# Patient Record
Sex: MALE | Race: WHITE | Employment: STUDENT | URBAN - NONMETROPOLITAN AREA
[De-identification: names, ages, dates, MRNs, and addresses within clinical notes are randomized per-mention and may not be internally consistent; named-entity substitution may affect disease eponyms.]

---
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
## 2022-03-27 DIAGNOSIS — F41.9 ANXIETY AND DEPRESSION: ICD-10-CM

## 2022-03-27 DIAGNOSIS — F32.A ANXIETY AND DEPRESSION: ICD-10-CM

## 2022-03-28 RX ORDER — FLUOXETINE HYDROCHLORIDE 40 MG/1
40 CAPSULE ORAL DAILY
Qty: 30 CAPSULE | Refills: 0 | Status: SHIPPED | OUTPATIENT
Start: 2022-03-28 | End: 2022-04-07 | Stop reason: SDUPTHER

## 2022-03-28 NOTE — TELEPHONE ENCOUNTER
Left message on answering machine requesting pt to call back at earliest convenience in regards to scheduling an appt.

## 2022-04-07 ENCOUNTER — TELEMEDICINE (OUTPATIENT)
Dept: FAMILY MEDICINE CLINIC | Age: 22
End: 2022-04-07
Payer: COMMERCIAL

## 2022-04-07 DIAGNOSIS — F41.9 ANXIETY AND DEPRESSION: Primary | ICD-10-CM

## 2022-04-07 DIAGNOSIS — F32.A ANXIETY AND DEPRESSION: Primary | ICD-10-CM

## 2022-04-07 DIAGNOSIS — J45.40 MODERATE PERSISTENT ASTHMA WITHOUT COMPLICATION: ICD-10-CM

## 2022-04-07 PROBLEM — J45.909 ASTHMA: Status: ACTIVE | Noted: 2020-10-05

## 2022-04-07 PROBLEM — F41.8 MIXED ANXIETY DEPRESSIVE DISORDER: Status: ACTIVE | Noted: 2020-10-05

## 2022-04-07 PROCEDURE — 99213 OFFICE O/P EST LOW 20 MIN: CPT | Performed by: STUDENT IN AN ORGANIZED HEALTH CARE EDUCATION/TRAINING PROGRAM

## 2022-04-07 RX ORDER — FLUOXETINE HYDROCHLORIDE 40 MG/1
40 CAPSULE ORAL DAILY
Qty: 90 CAPSULE | Refills: 0 | Status: SHIPPED | OUTPATIENT
Start: 2022-04-07 | End: 2022-05-21 | Stop reason: SDUPTHER

## 2022-04-07 ASSESSMENT — ENCOUNTER SYMPTOMS
COUGH: 0
COLOR CHANGE: 0
SHORTNESS OF BREATH: 0
VOMITING: 0
NAUSEA: 0
WHEEZING: 0

## 2022-04-07 NOTE — PROGRESS NOTES
2022    TELEHEALTH EVALUATION -- Audio/Visual (During MBNOW-29 public health emergency)    HPI:    Gold Clarke (:  2000) has requested an audio/video evaluation for the following concern(s):    Patient presents today for follow-up visit for anxiety and depression. Doing well on the Prozac 40mg daily. States he has been out of it for a week, forgot to  his prescription. Has not been able to get set up with counseling. Has had a busy semester at school. Eventually wants to come off the medication. Asthma: Doing well on the Pulmicort. No exacerbations recently. Does have a minor cold. Review of Systems   Constitutional: Negative for chills and fever. Respiratory: Negative for cough, shortness of breath and wheezing. Cardiovascular: Negative for chest pain. Gastrointestinal: Negative for nausea and vomiting. Skin: Negative for color change and rash. Neurological: Negative for light-headedness and headaches. Psychiatric/Behavioral: Negative for decreased concentration, dysphoric mood, sleep disturbance and suicidal ideas. The patient is not nervous/anxious. Prior to Visit Medications    Medication Sig Taking?  Authorizing Provider   FLUoxetine (PROZAC) 40 MG capsule Take 1 capsule by mouth daily Yes Tessy Ni MD   budesonide (PULMICORT FLEXHALER) 180 MCG/ACT AEPB inhaler Inhale 1 puff into the lungs nightly  Tessy Ni MD       Social History     Tobacco Use    Smoking status: Never Smoker    Smokeless tobacco: Never Used   Substance Use Topics    Alcohol use: Never    Drug use: Never            PHYSICAL EXAMINATION:  Constitutional: [x] Appears well-developed and well-nourished [] No apparent distress      [] Abnormal-   Mental status  [x] Alert and awake  [] Oriented to person/place/time []Able to follow commands      Eyes:  EOM    [x]  Normal  [] Abnormal-  Sclera  [x]  Normal  [] Abnormal -         Discharge [x]  None visible  [] Abnormal -    HENT:   [x] Normocephalic, atraumatic. [] Abnormal   [] Mouth/Throat: Mucous membranes are moist.     External Ears [x] Normal  [] Abnormal-     Neck: [x] No visualized mass     Pulmonary/Chest: [x] Respiratory effort normal.  [] No visualized signs of difficulty breathing or respiratory distress        [] Abnormal-      Musculoskeletal:   [] Normal gait with no signs of ataxia         [x] Normal range of motion of neck        [] Abnormal-       Neurological:        [x] No Facial Asymmetry (Cranial nerve 7 motor function) (limited exam to video visit)          [] No gaze palsy        [] Abnormal-         Skin:        [x] No significant exanthematous lesions or discoloration noted on facial skin         [] Abnormal-            Psychiatric:       [x] Normal Affect [] No Hallucinations        [] Abnormal-       ASSESSMENT/PLAN:  1. Anxiety and depression  - Stable. Continue on Prozac 40mg daily. Refills sent to pharmacy.   - FLUoxetine (PROZAC) 40 MG capsule; Take 1 capsule by mouth daily  Dispense: 90 capsule; Refill: 0    2. Moderate persistent asthma without complication  - Stable. Continue with Pulmicort. Return in about 1 year (around 4/7/2023). Regina Antonio, was evaluated through a synchronous (real-time) audio-video encounter. The patient (or guardian if applicable) is aware that this is a billable service, which includes applicable co-pays. This Virtual Visit was conducted with patient's (and/or legal guardian's) consent. The visit was conducted pursuant to the emergency declaration under the [address] [place] [address], [address] [place] [address] and the [employer] and [employer] General Act. Patient identification was verified, and a caregiver was present when appropriate. The patient was located at home in a state where the provider was licensed to provide care.       Total time spent on this encounter: Not billed by time    --Shavon Tsai MD on 4/7/2022 at 5:40 PM    An electronic signature was used to authenticate this note.

## 2022-04-07 NOTE — PROGRESS NOTES
Anthony Brooks is a 25 y.o. male being evaluated by a Virtual Visit (video visit) encounter to address concerns as mentioned above. A caregiver was present when appropriate. Due to this being a TeleHealth encounter (During IGEKL-89 public health emergency), evaluation of the following organ systems was limited: Vitals/Constitutional/EENT/Resp/CV/GI//MS/Neuro/Skin/Heme-Lymph-Imm. Pursuant to the emergency declaration under the [address] [place] [address] and the [employer] Act, this Virtual Visit was conducted with patient's (and/or legal guardian's) consent, to reduce the patient's risk of exposure to COVID-19 and provide necessary medical care. The patient (and/or legal guardian) has also been advised to contact this office for worsening conditions or problems, and seek emergency medical treatment and/or call 911 if deemed necessary. Services were provided through a video synchronous discussion virtually to substitute for in-person clinic visit. Patient and provider were located at their individual homes. --Fanta Bañuelos MD on 4/7/2022 at 6:01 PM    An electronic signature was used to authenticate this note. SUBJECTIVE     Anthony Brooks is a 25 y.o.male    Chief Complaint   Patient presents with    Follow-up       Chief complaint, [place], and all pertinent details of the case reviewed with the resident. Please see resident's note for specific details discussed at today's visit. Patient Active Problem List   Diagnosis    Asthma    Anxiety and depression       Current Outpatient Medications   Medication Sig Dispense Refill    FLUoxetine (PROZAC) 40 MG capsule Take 1 capsule by mouth daily 90 capsule 0    budesonide (PULMICORT FLEXHALER) 180 MCG/ACT AEPB inhaler Inhale 1 puff into the lungs nightly 1 each 2     No current facility-administered medications for this visit.        Review of Systems per Dr. Eboni Collins 4/7/2022   Patient-Reported Weight 195   Patient-Reported Height 59   Patient-Reported Systolic 50   Patient-Reported Diastolic 50   Patient-Reported Pulse [id number]   Patient-Reported Temperature 98.6        Physical Exam per Dr. Tian Russell      No results found for: LABA1C    No results found for: CHOL, TRIG, HDL, LDLCALC, LDLDIRECT    The ASCVD Risk score (Saundra Reyes, et al., 2013) failed to calculate for the following reasons:     The 2013 ASCVD risk score is only valid for ages 36 to 78    No results found for: NA, K, CL, CO2, BUN, CREATININE, GLUCOSE, CALCIUM, PROT, LABALBU, BILITOT, ALKPHOS, AST, ALT, LABGLOM, GFRAA, AGRATIO, GLOB    No results found for: LABMICR, [id number]    No results found for: TSH, T7IMCIR, X0YPQTA, THYROIDAB, FT3, T4FREE    No results found for: WBC, HGB, HCT, MCV, PLT    No results found for: PSA, PSADIA    Immunization History   Administered Date(s) Administered    COVID-19, J&J, PF, 0.5 mL 04/02/2021    COVID-19, US Vaccine, Vaccine Unspecified 01/05/2022    Hepatitis B Ped/Adol (Engerix-B, Recombivax HB) 06/05/2003    Hib (HbOC) 06/05/2003    Influenza, MDCK Quadv, with preserv IM (Flucelvax 2 yrs and older) 11/10/2020    MMR 06/05/2003    Varicella (Varivax) 06/05/2003       Health Maintenance   Topic Date Due    Hepatitis C screen  Never done    Hepatitis B vaccine (2 of 3 - 3-dose primary series) 07/03/2003    Varicella vaccine (2 of 2 - 2-dose childhood series) 05/03/2006    Pneumococcal 0-64 years Vaccine (1 of 2 - PPSV23) Never done    HPV vaccine (1 - Male 2-dose series) Never done    HIV screen  Never done    DTaP/Tdap/Td vaccine (1 - Tdap) Never done    Depression Monitoring  01/04/2022    Flu vaccine (Season Ended) 09/01/2022    COVID-19 Vaccine  Completed    Hepatitis A vaccine  Aged Out    Hib vaccine  Aged Out    Meningococcal (ACWY) vaccine  Aged Out       No future appointments. ASSESSMENT       Diagnosis Orders   1. Anxiety and depression  FLUoxetine (PROZAC) 40 MG capsule   2. Moderate persistent asthma without complication         PLAN      After discussion with , we agreed on plan as follows:    Continue Pulmicort at this time  Refill Prozac at this time. Consider counseling. Follow up annually, as pt doing well at this time, or sooner if worse. Attending Physician Statement  I have discussed the case, including pertinent history and exam findings with the resident. I agree with the documented assessment and plan as documented by the resident.   GE modifier added  to this encounter     Electronically signed by Dorian Melo MD on 4/7/2022 at 6:01 PM

## 2022-05-15 DIAGNOSIS — F32.A ANXIETY AND DEPRESSION: ICD-10-CM

## 2022-05-15 DIAGNOSIS — J45.20 MILD INTERMITTENT ASTHMA WITHOUT COMPLICATION: ICD-10-CM

## 2022-05-15 DIAGNOSIS — F41.9 ANXIETY AND DEPRESSION: ICD-10-CM

## 2022-05-16 RX ORDER — FLUOXETINE HYDROCHLORIDE 40 MG/1
40 CAPSULE ORAL DAILY
Qty: 90 CAPSULE | Refills: 0 | OUTPATIENT
Start: 2022-05-16 | End: 2022-08-14

## 2022-05-21 DIAGNOSIS — F41.9 ANXIETY AND DEPRESSION: ICD-10-CM

## 2022-05-21 DIAGNOSIS — F32.A ANXIETY AND DEPRESSION: ICD-10-CM

## 2022-05-21 DIAGNOSIS — J45.20 MILD INTERMITTENT ASTHMA WITHOUT COMPLICATION: ICD-10-CM

## 2022-05-23 RX ORDER — FLUOXETINE HYDROCHLORIDE 40 MG/1
40 CAPSULE ORAL DAILY
Qty: 90 CAPSULE | Refills: 0 | Status: SHIPPED | OUTPATIENT
Start: 2022-05-23 | End: 2022-08-28 | Stop reason: SDUPTHER

## 2022-05-23 NOTE — TELEPHONE ENCOUNTER
Patient's last appointment was : 4/7/2022  Patient's next appointment is : No future appointments.   Last refilled:12/22/21    No results found for: LABA1C  No results found for: CHOL, TRIG, HDL, LDLCALC, LDLDIRECT  No results found for: NA, K, CL, CO2, BUN, CREATININE, GLUCOSE, CALCIUM, PROT, LABALBU, BILITOT, ALKPHOS, AST, ALT, LABGLOM, GFRAA, AGRATIO, GLOB  No results found for: TSH, W5THINU, Q8HIROF, THYROIDAB, FT3, T4FREE  No results found for: WBC, HGB, HCT, MCV, PLT

## 2022-08-28 DIAGNOSIS — F41.9 ANXIETY AND DEPRESSION: ICD-10-CM

## 2022-08-28 DIAGNOSIS — J45.20 MILD INTERMITTENT ASTHMA WITHOUT COMPLICATION: ICD-10-CM

## 2022-08-28 DIAGNOSIS — F32.A ANXIETY AND DEPRESSION: ICD-10-CM

## 2022-08-29 NOTE — TELEPHONE ENCOUNTER
Patient's last appointment was : 4/7/2022  Patient's next appointment is : No future appointments.   Last refilled:5/23/22    No results found for: LABA1C  No results found for: CHOL, TRIG, HDL, LDLCALC, LDLDIRECT  No results found for: NA, K, CL, CO2, BUN, CREATININE, GLUCOSE, CALCIUM, PROT, LABALBU, BILITOT, ALKPHOS, AST, ALT, LABGLOM, GFRAA, AGRATIO, GLOB  No results found for: TSH, W6AKNXC, E8WMASM, THYROIDAB, FT3, T4FREE  No results found for: WBC, HGB, HCT, MCV, PLT

## 2022-08-31 RX ORDER — FLUOXETINE HYDROCHLORIDE 40 MG/1
40 CAPSULE ORAL DAILY
Qty: 30 CAPSULE | Refills: 0 | Status: SHIPPED | OUTPATIENT
Start: 2022-08-31 | End: 2022-09-27 | Stop reason: SDUPTHER

## 2022-09-27 DIAGNOSIS — F32.A ANXIETY AND DEPRESSION: ICD-10-CM

## 2022-09-27 DIAGNOSIS — F41.9 ANXIETY AND DEPRESSION: ICD-10-CM

## 2022-09-28 NOTE — TELEPHONE ENCOUNTER
Patient's last appointment was : 4/7/2022  Patient's next appointment is :   Future Appointments   Date Time Provider Artur Paiz   11/1/2022  8:00 AM [facility]  [address]     Last refilled:8/13/22    No results found for: LABA1C  No results found for: CHOL, TRIG, HDL, LDLCALC, LDLDIRECT  No results found for: NA, K, CL, CO2, BUN, CREATININE, GLUCOSE, CALCIUM, PROT, LABALBU, BILITOT, ALKPHOS, AST, ALT, LABGLOM, GFRAA, AGRATIO, GLOB  No results found for: TSH, N1HPHTX, R4CPAEL, THYROIDAB, FT3, T4FREE  No results found for: WBC, HGB, HCT, MCV, PLT

## 2022-10-09 RX ORDER — FLUOXETINE HYDROCHLORIDE 40 MG/1
40 CAPSULE ORAL DAILY
Qty: 30 CAPSULE | Refills: 0 | Status: SHIPPED | OUTPATIENT
Start: 2022-10-09 | End: 2022-11-01 | Stop reason: SDUPTHER

## 2022-10-09 NOTE — TELEPHONE ENCOUNTER
Medication refilled, please let us know if you need anything. Please keep your appointment for next month, Thank you.

## 2022-11-01 ENCOUNTER — OFFICE VISIT (OUTPATIENT)
Dept: FAMILY MEDICINE CLINIC | Age: 22
End: 2022-11-01
Payer: COMMERCIAL

## 2022-11-01 VITALS
TEMPERATURE: 97.4 F | OXYGEN SATURATION: 98 % | DIASTOLIC BLOOD PRESSURE: 72 MMHG | WEIGHT: 196.4 LBS | BODY MASS INDEX: 28.12 KG/M2 | HEIGHT: 70 IN | HEART RATE: 78 BPM | SYSTOLIC BLOOD PRESSURE: 118 MMHG

## 2022-11-01 DIAGNOSIS — F41.9 ANXIETY AND DEPRESSION: ICD-10-CM

## 2022-11-01 DIAGNOSIS — F32.A ANXIETY AND DEPRESSION: ICD-10-CM

## 2022-11-01 DIAGNOSIS — J45.20 MILD INTERMITTENT ASTHMA WITHOUT COMPLICATION: ICD-10-CM

## 2022-11-01 PROCEDURE — 99214 OFFICE O/P EST MOD 30 MIN: CPT

## 2022-11-01 RX ORDER — FLUOXETINE HYDROCHLORIDE 40 MG/1
40 CAPSULE ORAL DAILY
Qty: 90 CAPSULE | Refills: 0 | Status: SHIPPED | OUTPATIENT
Start: 2022-11-01 | End: 2023-01-30

## 2022-11-01 SDOH — ECONOMIC STABILITY: FOOD INSECURITY: WITHIN THE PAST 12 MONTHS, YOU WORRIED THAT YOUR FOOD WOULD RUN OUT BEFORE YOU GOT MONEY TO BUY MORE.: NEVER TRUE

## 2022-11-01 SDOH — ECONOMIC STABILITY: FOOD INSECURITY: WITHIN THE PAST 12 MONTHS, THE FOOD YOU BOUGHT JUST DIDN'T LAST AND YOU DIDN'T HAVE MONEY TO GET MORE.: NEVER TRUE

## 2022-11-01 ASSESSMENT — ENCOUNTER SYMPTOMS
SHORTNESS OF BREATH: 0
ABDOMINAL PAIN: 0
NAUSEA: 0
VOMITING: 0
CHEST TIGHTNESS: 0
CONSTIPATION: 0
RHINORRHEA: 0
WHEEZING: 0
EYE PAIN: 0
SORE THROAT: 0
DIARRHEA: 0

## 2022-11-01 ASSESSMENT — PATIENT HEALTH QUESTIONNAIRE - PHQ9
SUM OF ALL RESPONSES TO PHQ QUESTIONS 1-9: 5
6. FEELING BAD ABOUT YOURSELF - OR THAT YOU ARE A FAILURE OR HAVE LET YOURSELF OR YOUR FAMILY DOWN: 0
9. THOUGHTS THAT YOU WOULD BE BETTER OFF DEAD, OR OF HURTING YOURSELF: 0
2. FEELING DOWN, DEPRESSED OR HOPELESS: 0
SUM OF ALL RESPONSES TO PHQ QUESTIONS 1-9: 5
7. TROUBLE CONCENTRATING ON THINGS, SUCH AS READING THE NEWSPAPER OR WATCHING TELEVISION: 1
SUM OF ALL RESPONSES TO PHQ QUESTIONS 1-9: 5
5. POOR APPETITE OR OVEREATING: 1
8. MOVING OR SPEAKING SO SLOWLY THAT OTHER PEOPLE COULD HAVE NOTICED. OR THE OPPOSITE, BEING SO FIGETY OR RESTLESS THAT YOU HAVE BEEN MOVING AROUND A LOT MORE THAN USUAL: 0
1. LITTLE INTEREST OR PLEASURE IN DOING THINGS: 1
4. FEELING TIRED OR HAVING LITTLE ENERGY: 1
SUM OF ALL RESPONSES TO PHQ QUESTIONS 1-9: 5
3. TROUBLE FALLING OR STAYING ASLEEP: 1
10. IF YOU CHECKED OFF ANY PROBLEMS, HOW DIFFICULT HAVE THESE PROBLEMS MADE IT FOR YOU TO DO YOUR WORK, TAKE CARE OF THINGS AT HOME, OR GET ALONG WITH OTHER PEOPLE: 0
SUM OF ALL RESPONSES TO PHQ9 QUESTIONS 1 & 2: 1

## 2022-11-01 ASSESSMENT — SOCIAL DETERMINANTS OF HEALTH (SDOH): HOW HARD IS IT FOR YOU TO PAY FOR THE VERY BASICS LIKE FOOD, HOUSING, MEDICAL CARE, AND HEATING?: NOT HARD AT ALL

## 2022-11-01 NOTE — PROGRESS NOTES
Attending Physician Statement  I have discussed the case, including pertinent history and exam findings with the resident. I also have seen the patient and performed key portions of the examination. I agree with the documented assessment and plan as documented by the resident.   GC modifier added to this encounter      Nell Rush MD 11/1/2022 5:46 PM

## 2022-11-01 NOTE — PROGRESS NOTES
[address] [facility] [address] [address]  Dept: [phone]  Loc: [phone]      Please see Resident note for complete HPI. 27-year-old male here for medication refills  Anxiety and depression - on Prozac 40 mg PO daily. Doing well. Mild intermittent asthma - on Pulmicort QHS. Has been using his Pulmicort in the am occasionally as rescue inhaler. Not interested in vaccines today. ROS per Resident    No results found for: WBC, HGB, HCT, MCV, PLT  No results found for: NA, K, CL, CO2, BUN, CREATININE, GLUCOSE, CALCIUM, PROT, LABALBU, BILITOT, ALKPHOS, AST, ALT, LABGLOM, GFRAA, AGRATIO, GLOB  No results found for: LABA1C  No results found for: EAG  No results found for: Gio Jubilee  No results found for: TSH, C7PBLDM, S5XUKVH, THYROIDAB, FT3, T4FREE  No results found for: CHOL  No results found for: TRIG  No results found for: HDL  No results found for: LDLCHOLESTEROL, LDLCALC  No results found for: LABVLDL, VLDL  No results found for: CHOLHDLRATIO  No results found for: PSA, PSADIA    Health Maintenance Due   Topic Date Due    Varicella vaccine (2 of 2 - 2-dose childhood series) 05/03/2006    Pneumococcal 0-64 years Vaccine (1 - PCV) Never done    HPV vaccine (1 - Male 2-dose series) Never done    HIV screen  Never done    Hepatitis C screen  Never done    DTaP/Tdap/Td vaccine (1 - Tdap) Never done    COVID-19 Vaccine (3 - Booster for Alex series) 03/02/2022         Physical Exam per Resident       ICD-10-CM    1. Anxiety and depression  F41.9 FLUoxetine (PROZAC) 40 MG capsule    F32. A       2. Mild intermittent asthma without complication  P71.68 budesonide (PULMICORT FLEXHALER) 180 MCG/ACT AEPB inhaler              Plan  I participated in the discussion and care of this patient     Depression / anxiety - chronic, controlled. Continue Prozac  Mild intermittent asthma - chronic, controlled.  Continue Pulmicort. Prescribe Albuterol PRN for dyspnea. Recommend peak flow. Recommend Pneumovax.

## 2022-11-01 NOTE — PROGRESS NOTES
Health Maintenance Due   Topic Date Due    Varicella vaccine (2 of 2 - 2-dose childhood series) 05/03/2006    Pneumococcal 0-64 years Vaccine (1 - PCV) Never done    HPV vaccine (1 - Male 2-dose series) Never done    HIV screen  Never done    Hepatitis C screen  Never done    DTaP/Tdap/Td vaccine (1 - Tdap) Never done    Depression Monitoring  01/04/2022    COVID-19 Vaccine (3 - Booster for Alxe series) 03/02/2022    Flu vaccine (1) 08/01/2022

## 2022-11-01 NOTE — PROGRESS NOTES
Patient:Marty Kent Sex: male  YOB: 2000 Age:22 y.o. Date:11/1/2022   Chief Complaint: Medication Check    HPI   Lennox Fridge is a 80-year-old male with a medical history of asthma, allergies, autism, anxiety, and depression who came to the clinic for medication refills. Previously saw Dr. Marilee Khalil that things have been going well. Anxiety: Stable on Prozac 40 mg. States he is better than last semester because it was stressful with 19 credits, lacrosse practice and being new to [place]. Agreeable to therapy but not at this time, states that he has good support with siblings and family. Denies feelings of hopelessness, low energy, agitation, decreased concentration, guilt. Sleeps about 7 hrs a night, has trouble falling asleep at times but sleeps well throughout the night. Sometimes affected by afternoon nap and caffine intake (approximately 180 mg via energy drinks)      Asthma: spoke about inhalers and peak flow monitor  States he uses his pulmicort nightly and sometimes when he has a prolonged cough during allergy season. Discussed difference between maintenance and rescue inhaler use, states he has both and has not needed his rescue inhaler. Given peak flow to log and track values, to differentiate between asthma episodes and anxiety. Not interested in obtaining lab work, and will obtain full vaccine records, otherwise up to date. Patient History    Past Medical History:   has a past medical history of Allergies, Asthma, and Autism. Past Surgical History:   Procedure Laterality Date    TONSILLECTOMY AND ADENOIDECTOMY          Social History:   reports that he has never smoked. He has never used smokeless tobacco. He reports that he does not drink alcohol and does not use drugs. No family history on file. Review of Systems   Review of Systems   Constitutional:  Negative for chills, diaphoresis and fever.    HENT:  Negative for congestion, ear discharge, ear pain, rhinorrhea, sneezing and sore throat. Eyes:  Negative for pain. Respiratory:  Negative for chest tightness, shortness of breath and wheezing. Cardiovascular:  Negative for chest pain and leg swelling. Gastrointestinal:  Negative for abdominal pain, constipation, diarrhea, nausea and vomiting. Musculoskeletal:  Negative for arthralgias and myalgias. Skin:  Negative for rash. Neurological:  Negative for syncope, light-headedness and headaches. Psychiatric/Behavioral: Negative. Denies changes in sleep, appetite (other than normal variation with student life), energy. Interested in daily activities, and hobbies     Medications     Current Outpatient Medications   Medication Sig Dispense Refill    FLUoxetine (PROZAC) 40 MG capsule Take 1 capsule by mouth daily 30 capsule 0    budesonide (PULMICORT FLEXHALER) 180 MCG/ACT AEPB inhaler Inhale 1 puff into the lungs nightly 1 each 0     No current facility-administered medications for this visit. Vitals & Physical Examination     Vitals:    11/01/22 0812   BP: 118/72   Site: Left Upper Arm   Position: Sitting   Cuff Size: Medium Adult   Pulse: 78   Temp: 97.4 °F (36.3 °C)   TempSrc: Temporal   SpO2: 98%   Weight: 196 lb 6.4 oz (89.1 kg)   Height: 5' 9.5\" (1.765 m)    Body mass index is 28.59 kg/m². Physical Exam  Vitals reviewed. Constitutional:       General: He is not in acute distress. Appearance: Normal appearance. He is not ill-appearing or toxic-appearing. HENT:      Head: Normocephalic and atraumatic. Right Ear: External ear normal.      Left Ear: External ear normal.      Nose: Nose normal.   Eyes:      General:         Right eye: No discharge. Left eye: No discharge. Conjunctiva/sclera: Conjunctivae normal.   Cardiovascular:      Rate and Rhythm: Normal rate and regular rhythm. Pulses: Normal pulses. Heart sounds: Normal heart sounds. No murmur heard. No gallop.    Pulmonary:      Effort: Pulmonary effort is normal. No respiratory distress. Breath sounds: Normal breath sounds. No wheezing, rhonchi or rales. Abdominal:      General: Abdomen is flat. Bowel sounds are normal.      Palpations: Abdomen is soft. Tenderness: There is no abdominal tenderness. Musculoskeletal:         General: Normal range of motion. Cervical back: Normal range of motion and neck supple. Right lower leg: No edema. Left lower leg: No edema. Skin:     General: Skin is warm and dry. Capillary Refill: Capillary refill takes less than 2 seconds. Findings: No rash. Neurological:      General: No focal deficit present. Mental Status: He is alert. Psychiatric:         Mood and Affect: Mood normal.         Behavior: Behavior normal.         Thought Content: Thought content normal.     Results & Diagnostics   I have reviewed: medication list, health maintenance    Most Recent Labs:  No results found for: WBC, HGB, HCT, PLT, CHOL, TRIG, HDL, LDLDIRECT, ALT, AST, NA, CL, K, CREATININE, BUN, CO2, TSH, INR, GLUF, LABA1C, PSA, LABMICR    Most recent Imaging:  No results found. Assessment & Plan:          ICD-10-CM    1. Anxiety and depression  F41.9     F32. A    Stable on Prozac 40 mg. Refilled medication   Discussed referral to therapy before a big changes occur to help handle the stress, states he feels good with the family support currently. Follow up in 6 months   2. Mild intermittent asthma without complication  J27.22    Stable with Pulmicort QHS. Refilled medication   Discussed maintenance vs rescue inhaler, endorsed understanding and stated he did not need a refill for his rescue inhaler. -Gave peak flow meter to log values over time(to differentiate when he needed the inhaler vs when it was his anxiety)  Follow up in 6 months.             Addressed this visit:  Acute Concerns: No significant medical complaints  Chronic Concerns Addressed: Asthma, Anxiety  Health Maintenance: Reviewed Flu shot received at [facility], discussed other vaccinations Not interested in HPV, will obtain other records  Patient Education: Using medications safely, and peak flow monitor to differentiate anxiety vs asthma episodes based on peak flow volumes.    Follow Up [place]: 6 months or as needed        An electronic signature was used to authenticate this note  - Dago Garay MD PGY-1 11/1/2022 at 8:22 AM

## 2023-01-23 DIAGNOSIS — J45.20 MILD INTERMITTENT ASTHMA WITHOUT COMPLICATION: ICD-10-CM

## 2023-01-24 NOTE — TELEPHONE ENCOUNTER
Patient's last appointment was : 11/1/22  Patient's next appointment is :  6/5/23  Last refilled: 11/1/22  [address] [facility] Verified    No results found for: LABA1C  No results found for: CHOL, TRIG, HDL, LDLCALC, LDLDIRECT  No results found for: NA, K, CL, CO2, BUN, CREATININE, GLUCOSE, CALCIUM, PROT, LABALBU, BILITOT, ALKPHOS, AST, ALT, LABGLOM, GFRAA, AGRATIO, GLOB  No results found for: TSH, M0EGWNI, N7KDWAN, THYROIDAB, FT3, T4FREE  No results found for: WBC, HGB, HCT, MCV, PLT

## 2023-02-14 DIAGNOSIS — F41.9 ANXIETY AND DEPRESSION: ICD-10-CM

## 2023-02-14 DIAGNOSIS — F32.A ANXIETY AND DEPRESSION: ICD-10-CM

## 2023-02-15 RX ORDER — FLUOXETINE HYDROCHLORIDE 40 MG/1
40 CAPSULE ORAL DAILY
Qty: 90 CAPSULE | Refills: 0 | Status: SHIPPED | OUTPATIENT
Start: 2023-02-15 | End: 2023-05-16

## 2023-02-15 NOTE — TELEPHONE ENCOUNTER
Patient's last appointment was : 11/01/2022  Patient's next appointment is :  06/05/2023  Last refilled: 11/01/2022  [address] Verified    No results found for: LABA1C  No results found for: CHOL, TRIG, HDL, LDLCALC, LDLDIRECT  No results found for: NA, K, CL, CO2, BUN, CREATININE, GLUCOSE, CALCIUM, PROT, LABALBU, BILITOT, ALKPHOS, AST, ALT, LABGLOM, GFRAA, AGRATIO, GLOB  No results found for: TSH, N5GSIOW, Z0NMLWJ, THYROIDAB, FT3, T4FREE  No results found for: WBC, HGB, HCT, MCV, PLT

## 2023-05-18 DIAGNOSIS — F41.9 ANXIETY AND DEPRESSION: ICD-10-CM

## 2023-05-18 DIAGNOSIS — F32.A ANXIETY AND DEPRESSION: ICD-10-CM

## 2023-05-18 RX ORDER — FLUOXETINE HYDROCHLORIDE 40 MG/1
40 CAPSULE ORAL DAILY
Qty: 90 CAPSULE | Refills: 0 | Status: SHIPPED | OUTPATIENT
Start: 2023-05-18 | End: 2023-08-16

## 2023-05-18 NOTE — TELEPHONE ENCOUNTER
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 Pt is requesting 90 days.      Patient's last appointment was : 11/1/22  Patient's next appointment is :  6/5/23  Last refilled: 2/15/23  [facility]    No results found for: LABA1C  No results found for: CHOL, TRIG, HDL, LDLCALC, LDLDIRECT  No results found for: NA, K, CL, CO2, BUN, CREATININE, GLUCOSE, CALCIUM, PROT, LABALBU, BILITOT, ALKPHOS, AST, ALT, LABGLOM, GFRAA, AGRATIO, GLOB  No results found for: TSH, O4ZMTFS, H1USHPO, THYROIDAB, FT3, T4FREE  No results found for: WBC, HGB, HCT, MCV, PLT

## 2023-06-05 ENCOUNTER — OFFICE VISIT (OUTPATIENT)
Dept: FAMILY MEDICINE CLINIC | Age: 23
End: 2023-06-05
Payer: COMMERCIAL

## 2023-06-05 VITALS
BODY MASS INDEX: 28.98 KG/M2 | WEIGHT: 202.4 LBS | DIASTOLIC BLOOD PRESSURE: 62 MMHG | OXYGEN SATURATION: 98 % | RESPIRATION RATE: 14 BRPM | TEMPERATURE: 98.4 F | SYSTOLIC BLOOD PRESSURE: 118 MMHG | HEIGHT: 70 IN | HEART RATE: 86 BPM

## 2023-06-05 DIAGNOSIS — F41.9 ANXIETY AND DEPRESSION: ICD-10-CM

## 2023-06-05 DIAGNOSIS — F32.A ANXIETY AND DEPRESSION: ICD-10-CM

## 2023-06-05 DIAGNOSIS — J45.20 MILD INTERMITTENT ASTHMA WITHOUT COMPLICATION: Primary | ICD-10-CM

## 2023-06-05 PROCEDURE — 99214 OFFICE O/P EST MOD 30 MIN: CPT

## 2023-06-05 RX ORDER — BUDESONIDE AND FORMOTEROL FUMARATE DIHYDRATE 160; 4.5 UG/1; UG/1
2 AEROSOL RESPIRATORY (INHALATION) 2 TIMES DAILY
Qty: 10.2 G | Refills: 0 | Status: SHIPPED | OUTPATIENT
Start: 2023-06-05

## 2023-06-05 SDOH — ECONOMIC STABILITY: INCOME INSECURITY: HOW HARD IS IT FOR YOU TO PAY FOR THE VERY BASICS LIKE FOOD, HOUSING, MEDICAL CARE, AND HEATING?: NOT HARD AT ALL

## 2023-06-05 SDOH — ECONOMIC STABILITY: FOOD INSECURITY: WITHIN THE PAST 12 MONTHS, YOU WORRIED THAT YOUR FOOD WOULD RUN OUT BEFORE YOU GOT MONEY TO BUY MORE.: NEVER TRUE

## 2023-06-05 SDOH — ECONOMIC STABILITY: FOOD INSECURITY: WITHIN THE PAST 12 MONTHS, THE FOOD YOU BOUGHT JUST DIDN'T LAST AND YOU DIDN'T HAVE MONEY TO GET MORE.: NEVER TRUE

## 2023-06-05 SDOH — ECONOMIC STABILITY: HOUSING INSECURITY
IN THE LAST 12 MONTHS, WAS THERE A TIME WHEN YOU DID NOT HAVE A STEADY PLACE TO SLEEP OR SLEPT IN A SHELTER (INCLUDING NOW)?: NO

## 2023-06-05 ASSESSMENT — ENCOUNTER SYMPTOMS
EYE PAIN: 0
NAUSEA: 0
ABDOMINAL PAIN: 0
SHORTNESS OF BREATH: 0
CONSTIPATION: 0
DIARRHEA: 0
VOMITING: 0

## 2023-06-05 ASSESSMENT — PATIENT HEALTH QUESTIONNAIRE - PHQ9
1. LITTLE INTEREST OR PLEASURE IN DOING THINGS: 0
SUM OF ALL RESPONSES TO PHQ QUESTIONS 1-9: 0
5. POOR APPETITE OR OVEREATING: 0
7. TROUBLE CONCENTRATING ON THINGS, SUCH AS READING THE NEWSPAPER OR WATCHING TELEVISION: 0
SUM OF ALL RESPONSES TO PHQ QUESTIONS 1-9: 0
4. FEELING TIRED OR HAVING LITTLE ENERGY: 0
2. FEELING DOWN, DEPRESSED OR HOPELESS: 0
SUM OF ALL RESPONSES TO PHQ QUESTIONS 1-9: 0
9. THOUGHTS THAT YOU WOULD BE BETTER OFF DEAD, OR OF HURTING YOURSELF: 0
8. MOVING OR SPEAKING SO SLOWLY THAT OTHER PEOPLE COULD HAVE NOTICED. OR THE OPPOSITE, BEING SO FIGETY OR RESTLESS THAT YOU HAVE BEEN MOVING AROUND A LOT MORE THAN USUAL: 0
10. IF YOU CHECKED OFF ANY PROBLEMS, HOW DIFFICULT HAVE THESE PROBLEMS MADE IT FOR YOU TO DO YOUR WORK, TAKE CARE OF THINGS AT HOME, OR GET ALONG WITH OTHER PEOPLE: 0
SUM OF ALL RESPONSES TO PHQ9 QUESTIONS 1 & 2: 0
6. FEELING BAD ABOUT YOURSELF - OR THAT YOU ARE A FAILURE OR HAVE LET YOURSELF OR YOUR FAMILY DOWN: 0
3. TROUBLE FALLING OR STAYING ASLEEP: 0
SUM OF ALL RESPONSES TO PHQ QUESTIONS 1-9: 0

## 2023-06-05 NOTE — PROGRESS NOTES
After pharmacist chart review, the following recommendations are made:    Budesonide was noted to be taken differently than directed at last office visit. Consider asking pt how they are using their inhaler, and  on appropriate usage (take daily not prn). Consider switching budesonide to Symbicort for maintenance and rescue therapy.         Ashley Gilbert, PharmD Candidate 2024 06/05/23 9:16 AM    For Pharmacy Admin Tracking Only    Program: Medical [facility]  Recommendation Provided To: Provider: 1 via Note to Provider  Intervention Detail: New Rx: 1, reason: Needs Additional Therapy  Time Spent (min): 30
Health Maintenance Due   Topic Date Due    Varicella vaccine (2 of 2 - 2-dose childhood series) 05/03/2006    Pneumococcal 0-64 years Vaccine (1 - PCV) Never done    HPV vaccine (1 - Male 2-dose series) Never done    HIV screen  Never done    Hepatitis C screen  Never done    DTaP/Tdap/Td vaccine (1 - Tdap) Never done    COVID-19 Vaccine (3 - Booster for [employer] series) 03/02/2022
I saw and evaluated the patient, performing the key elements of the service. I discussed the findings, assessment and plan with the resident and agree with the resident's findings and plan as documented in the resident's note. Patient quite stable on treatment we will try Symbicort instead of Pulmicort to combine rescue and controlling agent.
list, health maintenance, notes from last encounter    Most Recent Labs:  No results found for: WBC, HGB, HCT, PLT, CHOL, TRIG, HDL, LDLDIRECT, ALT, AST, NA, CL, K, CREATININE, BUN, CO2, TSH, INR, GLUF, LABA1C, PSA, LABMICR    Most recent Imaging:  No results found. Assessment & Plan:          ICD-10-CM    1. Mild intermittent asthma without complication  U13.84 budesonide-formoterol (SYMBICORT) 160-4.5 MCG/ACT AERO   Stable on Pulmicort qhs.   -Consider Symbicort for SMART therapy, for maintenance and rescue. If not approved refill Pulmicort, and add albuterol for rescue     2. Anxiety and depression  F41.9     F32. A    Stable on Prozac 40 mg        Follow Up [place]: 6 weeks    Return in about 6 weeks (around 7/17/2023) for Medication follow up .       An electronic signature was used to authenticate this note  - Starr Hollins MD PGY-1 on 6/5/2023 at 1:48 PM

## 2023-07-03 DIAGNOSIS — J45.20 MILD INTERMITTENT ASTHMA WITHOUT COMPLICATION: ICD-10-CM

## 2023-07-06 RX ORDER — BUDESONIDE AND FORMOTEROL FUMARATE DIHYDRATE 160; 4.5 UG/1; UG/1
2 AEROSOL RESPIRATORY (INHALATION) 2 TIMES DAILY
Qty: 10.2 G | Refills: 2 | Status: SHIPPED | OUTPATIENT
Start: 2023-07-06

## 2023-08-28 DIAGNOSIS — F32.A ANXIETY AND DEPRESSION: ICD-10-CM

## 2023-08-28 DIAGNOSIS — F41.9 ANXIETY AND DEPRESSION: ICD-10-CM

## 2023-08-29 RX ORDER — FLUOXETINE HYDROCHLORIDE 40 MG/1
40 CAPSULE ORAL DAILY
Qty: 90 CAPSULE | Refills: 0 | Status: SHIPPED | OUTPATIENT
Start: 2023-08-29 | End: 2023-11-27

## 2023-08-29 NOTE — TELEPHONE ENCOUNTER
Patient's last appointment was : 6-5-23  Patient's next appointment is :  10-26-23  Last refilled: 5-18-23  Pharmacy Verified

## 2023-12-05 DIAGNOSIS — F41.9 ANXIETY AND DEPRESSION: ICD-10-CM

## 2023-12-05 DIAGNOSIS — F32.A ANXIETY AND DEPRESSION: ICD-10-CM

## 2023-12-05 DIAGNOSIS — J45.20 MILD INTERMITTENT ASTHMA WITHOUT COMPLICATION: ICD-10-CM

## 2023-12-06 NOTE — TELEPHONE ENCOUNTER
Patient's last appointment was : 6/5/2023 with our   Patient's next appointment is : Visit date not found with our Dr. Marita Paul refilled on: Multiple Dates  Which pharmacy does the script need sent to: [facility]      No results found for: \"LABA1C\"  No results found for: \"CHOL\", \"TRIG\", \"HDL\", \"LDLCALC\", \"LDLDIRECT\"  No results found for: \"NA\", \"K\", \"CL\", \"CO2\", \"BUN\", \"CREATININE\", \"GLUCOSE\", \"CALCIUM\", \"PROT\", \"LABALBU\", \"BILITOT\", \"ALKPHOS\", \"AST\", \"ALT\", \"LABGLOM\", \"GFRAA\", \"AGRATIO\", \"GLOB\"  No results found for: \"TSH\", \"V0RGQUM\", \"X6IEXOR\", \"THYROIDAB\", \"FT3\", \"T4FREE\"  No results found for: \"WBC\", \"HGB\", \"HCT\", \"MCV\", \"PLT\"

## 2023-12-07 RX ORDER — FLUOXETINE HYDROCHLORIDE 40 MG/1
40 CAPSULE ORAL DAILY
Qty: 90 CAPSULE | Refills: 0 | Status: SHIPPED | OUTPATIENT
Start: 2023-12-07 | End: 2024-03-06

## 2023-12-07 RX ORDER — ALBUTEROL SULFATE 90 UG/1
2 AEROSOL, METERED RESPIRATORY (INHALATION) 4 TIMES DAILY PRN
Qty: 18 G | Refills: 2 | Status: SHIPPED | OUTPATIENT
Start: 2023-12-07

## 2023-12-07 RX ORDER — BUDESONIDE AND FORMOTEROL FUMARATE DIHYDRATE 160; 4.5 UG/1; UG/1
2 AEROSOL RESPIRATORY (INHALATION) 2 TIMES DAILY
Qty: 10.2 G | Refills: 2 | Status: SHIPPED | OUTPATIENT
Start: 2023-12-07

## 2024-02-28 ENCOUNTER — HOSPITAL ENCOUNTER (EMERGENCY)
Age: 24
Discharge: HOME | End: 2024-02-28
Payer: COMMERCIAL

## 2024-02-28 VITALS
DIASTOLIC BLOOD PRESSURE: 79 MMHG | OXYGEN SATURATION: 98 % | SYSTOLIC BLOOD PRESSURE: 139 MMHG | TEMPERATURE: 98 F | WEIGHT: 192 LBS | BODY MASS INDEX: 28.44 KG/M2 | RESPIRATION RATE: 18 BRPM | HEART RATE: 76 BPM | HEIGHT: 69 IN

## 2024-02-28 DIAGNOSIS — J10.1 INFLUENZA A: Primary | ICD-10-CM

## 2024-02-28 LAB
FLUAV AG SPEC QL: POSITIVE
FLUBV AG SPEC QL: NEGATIVE
SARS-COV-2 RDRP RESP QL NAA+PROBE: NOT  DETECTED

## 2024-02-28 PROCEDURE — 87804 INFLUENZA ASSAY W/OPTIC: CPT

## 2024-02-28 PROCEDURE — 87635 SARS-COV-2 COVID-19 AMP PRB: CPT

## 2024-02-28 PROCEDURE — 99214 OFFICE O/P EST MOD 30 MIN: CPT | Performed by: NURSE PRACTITIONER

## 2024-02-28 PROCEDURE — 99213 OFFICE O/P EST LOW 20 MIN: CPT

## 2024-02-28 RX ORDER — BENZONATATE 200 MG/1
200 CAPSULE ORAL 3 TIMES DAILY PRN
Qty: 30 CAPSULE | Refills: 0 | Status: SHIPPED | OUTPATIENT
Start: 2024-02-28 | End: 2024-03-06

## 2024-02-28 ASSESSMENT — ENCOUNTER SYMPTOMS
RHINORRHEA: 1
COUGH: 1
SORE THROAT: 1
DIARRHEA: 0
NAUSEA: 0
VOMITING: 0
SHORTNESS OF BREATH: 0
CHEST TIGHTNESS: 0

## 2024-02-28 ASSESSMENT — PAIN - FUNCTIONAL ASSESSMENT: PAIN_FUNCTIONAL_ASSESSMENT: NONE - DENIES PAIN

## 2024-02-29 NOTE — ED PROVIDER NOTES
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  HISTORY OF PRESENT ILLNESS   Marty Elizondo is a 23 y.o. male who presents to the [facility] urgent care for evaluation of cough.  He reports his symptoms started roughly 2 days ago.  He reports symptoms as congestion, Tania, postnasal drainage, pharyngitis, cough, headache, fatigue, and myalgia.  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  Denies nausea, vomiting or diarrhea.    The history is provided by the patient. No  was used.       REVIEW OF SYSTEMS     Review of Systems   Constitutional:  Positive for chills, fatigue and fever. Negative for activity change and appetite change.   HENT:  Positive for congestion, postnasal drip, rhinorrhea and sore throat. Negative for ear discharge and ear pain.    Respiratory:  Positive for cough. Negative for chest tightness and shortness of breath.    Cardiovascular:  Negative for chest pain.   Gastrointestinal:  Negative for diarrhea, nausea and vomiting.   Genitourinary:  Negative for dysuria.   Skin:  Negative for rash.   Allergic/Immunologic: Negative for environmental allergies and food allergies.   Neurological:  Positive for headaches. Negative for dizziness.       PAST MEDICAL HISTORY         Diagnosis Date    Allergies     Asthma     Autism        SURGICALHISTORY     Patient  has a past surgical history that includes Tonsillectomy and adenoidectomy.    CURRENT MEDICATIONS       Discharge Medication List as of 2/28/2024  7:42 PM        CONTINUE these medications which have NOT CHANGED    Details   budesonide-formoterol (SYMBICORT) 160-4.5 MCG/ACT AERO Inhale 2 puffs into the lungs 2 times daily, Disp-10.2 g, R-2Normal      albuterol sulfate HFA (VENTOLIN HFA) 108 (90 Base)

## 2024-03-01 DIAGNOSIS — F41.9 ANXIETY AND DEPRESSION: ICD-10-CM

## 2024-03-01 DIAGNOSIS — F32.A ANXIETY AND DEPRESSION: ICD-10-CM

## 2024-03-01 RX ORDER — FLUOXETINE HYDROCHLORIDE 40 MG/1
CAPSULE ORAL
Qty: 90 CAPSULE | Refills: 0 | Status: SHIPPED | OUTPATIENT
Start: 2024-03-01

## 2024-03-01 NOTE — TELEPHONE ENCOUNTER
Patient's last appointment was : 6/5/2023 with our   Patient's next appointment is : Visit date not found with our    Last refilled on: 12/07/2023  Which pharmacy does the script need sent to: [facility]      No results found for: \"LABA1C\"  No results found for: \"CHOL\", \"TRIG\", \"HDL\", \"LDLCALC\", \"LDLDIRECT\"  No results found for: \"NA\", \"K\", \"CL\", \"CO2\", \"BUN\", \"CREATININE\", \"GLUCOSE\", \"CALCIUM\", \"PROT\", \"LABALBU\", \"BILITOT\", \"ALKPHOS\", \"AST\", \"ALT\", \"LABGLOM\", \"GFRAA\", \"AGRATIO\", \"GLOB\"  No results found for: \"TSH\", \"T4ABSXG\", \"Z4TMAIY\", \"THYROIDAB\", \"FT3\", \"T4FREE\"  No results found for: \"WBC\", \"HGB\", \"HCT\", \"MCV\", \"PLT\"

## 2024-03-19 DIAGNOSIS — F41.9 ANXIETY AND DEPRESSION: ICD-10-CM

## 2024-03-19 DIAGNOSIS — F32.A ANXIETY AND DEPRESSION: ICD-10-CM

## 2024-03-19 DIAGNOSIS — J45.20 MILD INTERMITTENT ASTHMA WITHOUT COMPLICATION: ICD-10-CM

## 2024-03-20 RX ORDER — BUDESONIDE AND FORMOTEROL FUMARATE DIHYDRATE 160; 4.5 UG/1; UG/1
2 AEROSOL RESPIRATORY (INHALATION) 2 TIMES DAILY
Qty: 10.2 G | Refills: 2 | Status: SHIPPED | OUTPATIENT
Start: 2024-03-20

## 2024-03-20 RX ORDER — FLUOXETINE HYDROCHLORIDE 40 MG/1
CAPSULE ORAL
Qty: 90 CAPSULE | Refills: 0 | Status: CANCELLED | OUTPATIENT
Start: 2024-03-20

## 2024-03-20 NOTE — TELEPHONE ENCOUNTER
Patient's last appointment was : 6/5/2023 with our   Patient's next appointment is : Visit date not found   Last refilled on: 12/07/2023  Which pharmacy does the script need sent to: [facility]      No results found for: \"LABA1C\"  No results found for: \"CHOL\", \"TRIG\", \"HDL\", \"LDLCALC\", \"LDLDIRECT\"  No results found for: \"NA\", \"K\", \"CL\", \"CO2\", \"BUN\", \"CREATININE\", \"GLUCOSE\", \"CALCIUM\", \"PROT\", \"LABALBU\", \"BILITOT\", \"ALKPHOS\", \"AST\", \"ALT\", \"LABGLOM\", \"GFRAA\", \"AGRATIO\", \"GLOB\"  No results found for: \"TSH\", \"N1AXDKQ\", \"L8PDXJB\", \"THYROIDAB\", \"FT3\", \"T4FREE\"  No results found for: \"WBC\", \"HGB\", \"HCT\", \"MCV\", \"PLT\"

## 2024-07-14 DIAGNOSIS — F32.A ANXIETY AND DEPRESSION: ICD-10-CM

## 2024-07-14 DIAGNOSIS — F41.9 ANXIETY AND DEPRESSION: ICD-10-CM

## 2024-07-15 RX ORDER — FLUOXETINE HYDROCHLORIDE 40 MG/1
CAPSULE ORAL
Qty: 90 CAPSULE | Refills: 0 | Status: SHIPPED | OUTPATIENT
Start: 2024-07-15

## 2024-08-12 ENCOUNTER — OFFICE VISIT (OUTPATIENT)
Dept: FAMILY MEDICINE CLINIC | Age: 24
End: 2024-08-12

## 2024-08-12 VITALS
HEIGHT: 70 IN | TEMPERATURE: 98.4 F | OXYGEN SATURATION: 98 % | RESPIRATION RATE: 20 BRPM | SYSTOLIC BLOOD PRESSURE: 124 MMHG | DIASTOLIC BLOOD PRESSURE: 76 MMHG | BODY MASS INDEX: 35.16 KG/M2 | WEIGHT: 245.6 LBS | HEART RATE: 76 BPM

## 2024-08-12 DIAGNOSIS — F32.A ANXIETY AND DEPRESSION: ICD-10-CM

## 2024-08-12 DIAGNOSIS — F41.9 ANXIETY AND DEPRESSION: ICD-10-CM

## 2024-08-12 DIAGNOSIS — B07.0 PLANTAR WART OF LEFT FOOT: Primary | ICD-10-CM

## 2024-08-12 DIAGNOSIS — J45.20 MILD INTERMITTENT ASTHMA WITHOUT COMPLICATION: ICD-10-CM

## 2024-08-12 SDOH — ECONOMIC STABILITY: INCOME INSECURITY: HOW HARD IS IT FOR YOU TO PAY FOR THE VERY BASICS LIKE FOOD, HOUSING, MEDICAL CARE, AND HEATING?: NOT HARD AT ALL

## 2024-08-12 SDOH — ECONOMIC STABILITY: FOOD INSECURITY: WITHIN THE PAST 12 MONTHS, YOU WORRIED THAT YOUR FOOD WOULD RUN OUT BEFORE YOU GOT MONEY TO BUY MORE.: NEVER TRUE

## 2024-08-12 SDOH — ECONOMIC STABILITY: FOOD INSECURITY: WITHIN THE PAST 12 MONTHS, THE FOOD YOU BOUGHT JUST DIDN'T LAST AND YOU DIDN'T HAVE MONEY TO GET MORE.: NEVER TRUE

## 2024-08-12 ASSESSMENT — PATIENT HEALTH QUESTIONNAIRE - PHQ9
4. FEELING TIRED OR HAVING LITTLE ENERGY: NOT AT ALL
5. POOR APPETITE OR OVEREATING: NOT AT ALL
8. MOVING OR SPEAKING SO SLOWLY THAT OTHER PEOPLE COULD HAVE NOTICED. OR THE OPPOSITE, BEING SO FIGETY OR RESTLESS THAT YOU HAVE BEEN MOVING AROUND A LOT MORE THAN USUAL: NOT AT ALL
7. TROUBLE CONCENTRATING ON THINGS, SUCH AS READING THE NEWSPAPER OR WATCHING TELEVISION: NOT AT ALL
SUM OF ALL RESPONSES TO PHQ QUESTIONS 1-9: 0
SUM OF ALL RESPONSES TO PHQ QUESTIONS 1-9: 0
6. FEELING BAD ABOUT YOURSELF - OR THAT YOU ARE A FAILURE OR HAVE LET YOURSELF OR YOUR FAMILY DOWN: NOT AT ALL
10. IF YOU CHECKED OFF ANY PROBLEMS, HOW DIFFICULT HAVE THESE PROBLEMS MADE IT FOR YOU TO DO YOUR WORK, TAKE CARE OF THINGS AT HOME, OR GET ALONG WITH OTHER PEOPLE: NOT DIFFICULT AT ALL
3. TROUBLE FALLING OR STAYING ASLEEP: NOT AT ALL
SUM OF ALL RESPONSES TO PHQ QUESTIONS 1-9: 0
9. THOUGHTS THAT YOU WOULD BE BETTER OFF DEAD, OR OF HURTING YOURSELF: NOT AT ALL
SUM OF ALL RESPONSES TO PHQ QUESTIONS 1-9: 0
2. FEELING DOWN, DEPRESSED OR HOPELESS: NOT AT ALL

## 2024-08-12 ASSESSMENT — ENCOUNTER SYMPTOMS
CONSTIPATION: 0
VOMITING: 0
CHEST TIGHTNESS: 0
NAUSEA: 0
ROS SKIN COMMENTS: FOOT WART
DIARRHEA: 0
WHEEZING: 0
SHORTNESS OF BREATH: 0
ABDOMINAL PAIN: 0

## 2024-08-16 NOTE — PROGRESS NOTES
Attending Physician Note    I have seen and evaluated the patient. I discussed the findings, assessment and plan with the resident and agree with the resident's findings and plan as documented in the resident's note.  GC modifier added.    Brief summary:  Plantar wart, 2nd toe - treated with cryotherapy.  Peeling skin over wart 5th toe - not infected based on today's exam.  Wart may resolve as area heals.  Recheck 2 weeks.    
unremarkable.  Impression: Soft tissue swelling great toe. No fracture.    **This report has been created using voice recognition software.  It may contain minor errors which are inherent in voice recognition technology.**    Final report electronically signed by Dr. Joseph Marte on 5/6/2021 2:29 PM    Data   I have reviewed:     BP Readings from Last 3 Encounters:   02/28/24 139/79   06/05/23 118/62   11/01/22 118/72     Wt Readings from Last 3 Encounters:   02/28/24 87.1 kg (192 lb)   06/05/23 91.8 kg (202 lb 6.4 oz)   11/01/22 89.1 kg (196 lb 6.4 oz)     Immunization History   Administered Date(s) Administered    COVID-19, J&J, (age 18y+), IM, 0.5 mL 04/02/2021    COVID-19, MODERNA BLUE border, Primary or Immunocompromised, (age 12y+), IM, 100 mcg/0.5mL 01/05/2022    COVID-19, [place] Vaccine, Vaccine Unspecified 01/05/2022    Hep B, ENGERIX-B, RECOMBIVAX-HB, (age Birth - 19y), IM, 0.5mL 06/05/2003    Hib (HbOC) 06/05/2003    Influenza Virus Vaccine 10/10/2022    Influenza, FLUCELVAX, (age 6 mo+), MDCK, MDV, 0.5mL 11/10/2020    MMR, PRIORIX, M-M-R II, (age 12m+), SC, 0.5mL 06/05/2003    Varicella, VARIVAX, (age 12m+), SC, 0.5mL 06/05/2003     Health Maintenance   Topic Date Due    Hepatitis B vaccine (2 of 3 - 3-dose series) 07/03/2003    Varicella vaccine (2 of 2 - 2-dose childhood series) 05/03/2006    Pneumococcal 0-64 years Vaccine (1 of 2 - PCV) Never done    HPV vaccine (1 - Male 3-dose series) Never done    HIV screen  Never done    Hepatitis C screen  Never done    DTaP/Tdap/Td vaccine (1 - Tdap) Never done    COVID-19 Vaccine (4 - 2023-24 season) 09/01/2023    Depression Monitoring  06/05/2024    Flu vaccine (1) 08/01/2024    Hepatitis A vaccine  Aged Out    Hib vaccine  Aged Out    Polio vaccine  Aged Out    Meningococcal (ACWY) vaccine  Aged Out    Measles,Mumps,Rubella (MMR) vaccine  Discontinued     The ASCVD Risk score (Jairo ROY, et al., 2019) failed to calculate for the following reasons:    The 2019

## 2024-11-23 DIAGNOSIS — F41.9 ANXIETY AND DEPRESSION: ICD-10-CM

## 2024-11-23 DIAGNOSIS — F32.A ANXIETY AND DEPRESSION: ICD-10-CM

## 2024-11-25 RX ORDER — FLUOXETINE 40 MG/1
CAPSULE ORAL
Qty: 90 CAPSULE | Refills: 0 | Status: SHIPPED | OUTPATIENT
Start: 2024-11-25

## 2024-11-25 NOTE — TELEPHONE ENCOUNTER
Patient's last appointment was: 8/12/2024  with our   Patient's next appointment is: Visit date not found    Last refilled on: 7/15/2024  Which pharmacy does the script need sent to: [facility]

## 2025-03-20 DIAGNOSIS — F32.A ANXIETY AND DEPRESSION: ICD-10-CM

## 2025-03-20 DIAGNOSIS — F41.9 ANXIETY AND DEPRESSION: ICD-10-CM

## 2025-03-21 RX ORDER — FLUOXETINE HYDROCHLORIDE 40 MG/1
CAPSULE ORAL
Qty: 90 CAPSULE | Refills: 0 | Status: SHIPPED | OUTPATIENT
Start: 2025-03-21

## 2025-03-21 NOTE — TELEPHONE ENCOUNTER
Patient's last appointment was: 8/12/2024  with our   Patient's next appointment is: Visit date not found    Last refilled on: 11/25/2024  Which pharmacy does the script need sent to: [facility]      No results found for: \"LABA1C\"  No results found for: \"CHOL\", \"TRIG\", \"HDL\", \"LDLDIRECT\"  No results found for: \"NA\", \"K\", \"CL\", \"CO2\", \"BUN\", \"CREATININE\", \"GLUCOSE\", \"CALCIUM\", \"LABALBU\", \"BILITOT\", \"ALKPHOS\", \"AST\", \"ALT\", \"LABGLOM\", \"GFRAA\", \"AGRATIO\", \"GLOB\"  No results found for: \"TSH\", \"P5RUTWJ\", \"THYROIDAB\", \"FT3\", \"T4FREE\"  No results found for: \"WBC\", \"HGB\", \"HCT\", \"MCV\", \"PLT\"

## 2025-03-31 SDOH — ECONOMIC STABILITY: INCOME INSECURITY: IN THE LAST 12 MONTHS, WAS THERE A TIME WHEN YOU WERE NOT ABLE TO PAY THE MORTGAGE OR RENT ON TIME?: NO

## 2025-03-31 SDOH — ECONOMIC STABILITY: FOOD INSECURITY: WITHIN THE PAST 12 MONTHS, YOU WORRIED THAT YOUR FOOD WOULD RUN OUT BEFORE YOU GOT MONEY TO BUY MORE.: NEVER TRUE

## 2025-03-31 SDOH — ECONOMIC STABILITY: FOOD INSECURITY: WITHIN THE PAST 12 MONTHS, THE FOOD YOU BOUGHT JUST DIDN'T LAST AND YOU DIDN'T HAVE MONEY TO GET MORE.: NEVER TRUE

## 2025-03-31 ASSESSMENT — PATIENT HEALTH QUESTIONNAIRE - PHQ9
1. LITTLE INTEREST OR PLEASURE IN DOING THINGS: SEVERAL DAYS
SUM OF ALL RESPONSES TO PHQ QUESTIONS 1-9: 10
3. TROUBLE FALLING OR STAYING ASLEEP: MORE THAN HALF THE DAYS
1. LITTLE INTEREST OR PLEASURE IN DOING THINGS: SEVERAL DAYS
8. MOVING OR SPEAKING SO SLOWLY THAT OTHER PEOPLE COULD HAVE NOTICED. OR THE OPPOSITE - BEING SO FIDGETY OR RESTLESS THAT YOU HAVE BEEN MOVING AROUND A LOT MORE THAN USUAL: NOT AT ALL
2. FEELING DOWN, DEPRESSED OR HOPELESS: MORE THAN HALF THE DAYS
3. TROUBLE FALLING OR STAYING ASLEEP: MORE THAN HALF THE DAYS
2. FEELING DOWN, DEPRESSED OR HOPELESS: MORE THAN HALF THE DAYS
10. IF YOU CHECKED OFF ANY PROBLEMS, HOW DIFFICULT HAVE THESE PROBLEMS MADE IT FOR YOU TO DO YOUR WORK, TAKE CARE OF THINGS AT HOME, OR GET ALONG WITH OTHER PEOPLE: SOMEWHAT DIFFICULT
SUM OF ALL RESPONSES TO PHQ QUESTIONS 1-9: 10
4. FEELING TIRED OR HAVING LITTLE ENERGY: NOT AT ALL
SUM OF ALL RESPONSES TO PHQ QUESTIONS 1-9: 8
9. THOUGHTS THAT YOU WOULD BE BETTER OFF DEAD, OR OF HURTING YOURSELF: MORE THAN HALF THE DAYS
7. TROUBLE CONCENTRATING ON THINGS, SUCH AS READING THE NEWSPAPER OR WATCHING TELEVISION: NOT AT ALL
5. POOR APPETITE OR OVEREATING: SEVERAL DAYS
6. FEELING BAD ABOUT YOURSELF - OR THAT YOU ARE A FAILURE OR HAVE LET YOURSELF OR YOUR FAMILY DOWN: MORE THAN HALF THE DAYS
6. FEELING BAD ABOUT YOURSELF - OR THAT YOU ARE A FAILURE OR HAVE LET YOURSELF OR YOUR FAMILY DOWN: MORE THAN HALF THE DAYS
SUM OF ALL RESPONSES TO PHQ QUESTIONS 1-9: 10
10. IF YOU CHECKED OFF ANY PROBLEMS, HOW DIFFICULT HAVE THESE PROBLEMS MADE IT FOR YOU TO DO YOUR WORK, TAKE CARE OF THINGS AT HOME, OR GET ALONG WITH OTHER PEOPLE: SOMEWHAT DIFFICULT
9. THOUGHTS THAT YOU WOULD BE BETTER OFF DEAD, OR OF HURTING YOURSELF: MORE THAN HALF THE DAYS
8. MOVING OR SPEAKING SO SLOWLY THAT OTHER PEOPLE COULD HAVE NOTICED. OR THE OPPOSITE, BEING SO FIGETY OR RESTLESS THAT YOU HAVE BEEN MOVING AROUND A LOT MORE THAN USUAL: NOT AT ALL
7. TROUBLE CONCENTRATING ON THINGS, SUCH AS READING THE NEWSPAPER OR WATCHING TELEVISION: NOT AT ALL
5. POOR APPETITE OR OVEREATING: SEVERAL DAYS
4. FEELING TIRED OR HAVING LITTLE ENERGY: NOT AT ALL
SUM OF ALL RESPONSES TO PHQ QUESTIONS 1-9: 10

## 2025-03-31 ASSESSMENT — COLUMBIA-SUICIDE SEVERITY RATING SCALE - C-SSRS
1. IN THE PAST MONTH, HAVE YOU WISHED YOU WERE DEAD OR WISHED YOU COULD GO TO SLEEP AND NOT WAKE UP?: YES
3. IN THE PAST MONTH, HAVE YOU BEEN THINKING ABOUT HOW YOU MIGHT KILL YOURSELF?: YES
5. IN THE PAST MONTH, HAVE YOU STARTED TO WORK OUT OR WORKED OUT THE DETAILS OF HOW TO KILL YOURSELF? DO YOU INTEND TO CARRY OUT THIS PLAN?: NO
2. IN THE PAST MONTH, HAVE YOU ACTUALLY HAD ANY THOUGHTS OF KILLING YOURSELF?: YES
6. IN YOUR LIFETIME, HAVE YOU EVER DONE ANYTHING, STARTED TO DO ANYTHING, OR PREPARED TO DO ANYTHING TO END YOUR LIFE?: NO
4. IN THE PAST MONTH, HAVE YOU HAD THESE THOUGHTS AND HAD SOME INTENTION OF ACTING ON THEM?: YES

## 2025-04-02 ENCOUNTER — OFFICE VISIT (OUTPATIENT)
Dept: FAMILY MEDICINE CLINIC | Age: 25
End: 2025-04-02
Payer: COMMERCIAL

## 2025-04-02 VITALS
HEIGHT: 70 IN | BODY MASS INDEX: 27.89 KG/M2 | RESPIRATION RATE: 14 BRPM | WEIGHT: 194.8 LBS | HEART RATE: 78 BPM | TEMPERATURE: 99 F | DIASTOLIC BLOOD PRESSURE: 66 MMHG | OXYGEN SATURATION: 97 % | SYSTOLIC BLOOD PRESSURE: 106 MMHG

## 2025-04-02 DIAGNOSIS — F32.A DEPRESSION, UNSPECIFIED DEPRESSION TYPE: Primary | ICD-10-CM

## 2025-04-02 PROCEDURE — 99214 OFFICE O/P EST MOD 30 MIN: CPT

## 2025-04-02 NOTE — PROGRESS NOTES
Attending Physician Note    I have seen and evaluated the patient. I discussed the findings, assessment and plan with the resident and agree with the resident's findings and plan as documented in the resident's note.  GC modifier added.    Brief summary:  Depression, situational exacerbation - back on fluoxetine.  Open to therapy.  Safety plan/supportive family.  Follow up 3 wks, sooner if needed    
tissue swelling great toe. No fracture.    **This report has been created using voice recognition software.  It may contain minor errors which are inherent in voice recognition technology.**    Final report electronically signed by Dr. Joseph Marte on 5/6/2021 2:29 PM    Data   I have reviewed: medication list, notes from last encounter    BP Readings from Last 3 Encounters:   04/02/25 106/66   08/12/24 124/76   02/28/24 139/79     Wt Readings from Last 3 Encounters:   04/02/25 88.4 kg (194 lb 12.8 oz)   08/12/24 111.4 kg (245 lb 9.6 oz)   02/28/24 87.1 kg (192 lb)     Immunization History   Administered Date(s) Administered    COVID-19, J&J, (age 18y+), IM, 0.5 mL 04/02/2021    COVID-19, MODERNA BLUE border, Primary or Immunocompromised, (age 12y+), IM, 100 mcg/0.5mL 01/05/2022    COVID-19, [place] Vaccine, Vaccine Unspecified 01/05/2022    Hep B, ENGERIX-B, RECOMBIVAX-HB, (age Birth - 19y), IM, 0.5mL 06/05/2003    Hib (HbOC) 06/05/2003    Influenza Virus Vaccine 10/10/2022    Influenza, FLUCELVAX, (age 6 mo+), MDCK, Quadv MDV, 0.5mL 11/10/2020    MMR, PRIORIX, M-M-R II, (age 12m+), SC, 0.5mL 06/05/2003    Varicella, VARIVAX, (age 12m+), SC, 0.5mL 06/05/2003     Health Maintenance   Topic Date Due    Hepatitis B vaccine (2 of 3 - 3-dose series) 07/03/2003    Varicella vaccine (2 of 2 - 2-dose childhood series) 05/03/2006    HPV vaccine (1 - Male 3-dose series) Never done    HIV screen  Never done    Meningococcal B vaccine (1 of 2 - Standard) Never done    Hepatitis C screen  Never done    DTaP/Tdap/Td vaccine (1 - Tdap) Never done    Pneumococcal 0-49 years Vaccine (1 of 2 - PCV) Never done    COVID-19 Vaccine (4 - 2024-25 season) 09/01/2024    Flu vaccine (Season Ended) 08/01/2025    Depression Monitoring  03/31/2026    Hepatitis A vaccine  Aged Out    Hib vaccine  Aged Out    Polio vaccine  Aged Out    Meningococcal (ACWY) vaccine  Aged Out    Measles,Mumps,Rubella (MMR) vaccine  Discontinued     The ASCVD Risk

## 2025-04-16 ENCOUNTER — OFFICE VISIT (OUTPATIENT)
Dept: PSYCHOLOGY | Age: 25
End: 2025-04-16
Payer: COMMERCIAL

## 2025-04-16 DIAGNOSIS — F43.20 ADJUSTMENT DISORDER IN REMISSION: Primary | ICD-10-CM

## 2025-04-16 PROCEDURE — 90791 PSYCH DIAGNOSTIC EVALUATION: CPT | Performed by: PSYCHOLOGIST

## 2025-04-16 ASSESSMENT — ANXIETY QUESTIONNAIRES
3. WORRYING TOO MUCH ABOUT DIFFERENT THINGS: NOT AT ALL
5. BEING SO RESTLESS THAT IT IS HARD TO SIT STILL: NOT AT ALL
1. FEELING NERVOUS, ANXIOUS, OR ON EDGE: NOT AT ALL
2. NOT BEING ABLE TO STOP OR CONTROL WORRYING: NOT AT ALL
GAD7 TOTAL SCORE: 0
6. BECOMING EASILY ANNOYED OR IRRITABLE: NOT AT ALL
7. FEELING AFRAID AS IF SOMETHING AWFUL MIGHT HAPPEN: NOT AT ALL
4. TROUBLE RELAXING: NOT AT ALL

## 2025-04-16 ASSESSMENT — PATIENT HEALTH QUESTIONNAIRE - PHQ9
SUM OF ALL RESPONSES TO PHQ QUESTIONS 1-9: 4
6. FEELING BAD ABOUT YOURSELF - OR THAT YOU ARE A FAILURE OR HAVE LET YOURSELF OR YOUR FAMILY DOWN: SEVERAL DAYS
2. FEELING DOWN, DEPRESSED OR HOPELESS: NOT AT ALL
5. POOR APPETITE OR OVEREATING: NOT AT ALL
SUM OF ALL RESPONSES TO PHQ QUESTIONS 1-9: 4
10. IF YOU CHECKED OFF ANY PROBLEMS, HOW DIFFICULT HAVE THESE PROBLEMS MADE IT FOR YOU TO DO YOUR WORK, TAKE CARE OF THINGS AT HOME, OR GET ALONG WITH OTHER PEOPLE: NOT DIFFICULT AT ALL
3. TROUBLE FALLING OR STAYING ASLEEP: SEVERAL DAYS
1. LITTLE INTEREST OR PLEASURE IN DOING THINGS: MORE THAN HALF THE DAYS
SUM OF ALL RESPONSES TO PHQ QUESTIONS 1-9: 4
8. MOVING OR SPEAKING SO SLOWLY THAT OTHER PEOPLE COULD HAVE NOTICED. OR THE OPPOSITE, BEING SO FIGETY OR RESTLESS THAT YOU HAVE BEEN MOVING AROUND A LOT MORE THAN USUAL: NOT AT ALL
SUM OF ALL RESPONSES TO PHQ QUESTIONS 1-9: 4
4. FEELING TIRED OR HAVING LITTLE ENERGY: NOT AT ALL
9. THOUGHTS THAT YOU WOULD BE BETTER OFF DEAD, OR OF HURTING YOURSELF: NOT AT ALL
7. TROUBLE CONCENTRATING ON THINGS, SUCH AS READING THE NEWSPAPER OR WATCHING TELEVISION: NOT AT ALL

## 2025-04-16 NOTE — PROGRESS NOTES
Marty Elizondo   4/16/2025       SUBJECTIVE DATA     Pt referred by Papa Lobo MD for depression with episode of SI    Introduction    Pt was seen for initial [facility] [place] appt.   Discussed with patient model of service to include the limits of confidentiality (i.e. child, elder abuse reporting), short-term problem-focused approach, note placed in medical record, and brief feedback given to referring provider.      Assess    Clarify Consultation Problem:      What they are reporting (symptoms, onset, how long, triggering events, etc.).   Let go abruptly from job out of college.  Very stressful. Had stopped taking Fluoxetine.  Had brief episode of SI.  No recurrence.  Protective factors?  Mother, father and the impact of patient's death on them.    Restarted Fluoxetine, has a high level of support from parents.  \"I have rallied a good bit since then.\"  \"I learned a lot.\"  Looking for other job opportunities.  \"I will find something.\"      Current symptoms?  \"stress and anxiety right now are minimal.\"      What makes sx worse and better:    Worse: \"staying up too late.\"  Sleep disturbance.      Better: Taking naps, Melatonin at night.       Assessment of functioning & impact of problem:       Home: no impact.  Helps parents with projects  Social Activities: \"Im a lonely person.  I really don't have friends.\"  Active in [place] community  Sleep: disrupted by napping during day, no schedule  Work/School: not working or school  Close/Important relationships: Sister, brother - good relationships.  No impact  Substance use: none  Recreation/Physical Activity: video games.  Played sports \"all my life\".  Football, soccer, lacrosse.  Would like to get back to running.       Psychosocial factors. High family support locally.  Single.  Homeschooled thoughout youth.      Brief review mental health treatment history: No prior talk therapy.      Chosen Values:  family, hard worker, \"radha is more important to me now.\"    OBJECTIVE

## 2025-04-23 ENCOUNTER — OFFICE VISIT (OUTPATIENT)
Dept: FAMILY MEDICINE CLINIC | Age: 25
End: 2025-04-23
Payer: COMMERCIAL

## 2025-04-23 VITALS
TEMPERATURE: 97.3 F | HEART RATE: 99 BPM | OXYGEN SATURATION: 97 % | HEIGHT: 68 IN | BODY MASS INDEX: 29.25 KG/M2 | WEIGHT: 193 LBS | DIASTOLIC BLOOD PRESSURE: 70 MMHG | RESPIRATION RATE: 18 BRPM | SYSTOLIC BLOOD PRESSURE: 116 MMHG

## 2025-04-23 DIAGNOSIS — F32.A DEPRESSION, UNSPECIFIED DEPRESSION TYPE: Primary | ICD-10-CM

## 2025-04-23 PROCEDURE — 99213 OFFICE O/P EST LOW 20 MIN: CPT

## 2025-04-23 ASSESSMENT — PATIENT HEALTH QUESTIONNAIRE - PHQ9
4. FEELING TIRED OR HAVING LITTLE ENERGY: SEVERAL DAYS
5. POOR APPETITE OR OVEREATING: NOT AT ALL
3. TROUBLE FALLING OR STAYING ASLEEP: NOT AT ALL
SUM OF ALL RESPONSES TO PHQ QUESTIONS 1-9: 2
9. THOUGHTS THAT YOU WOULD BE BETTER OFF DEAD, OR OF HURTING YOURSELF: NOT AT ALL
8. MOVING OR SPEAKING SO SLOWLY THAT OTHER PEOPLE COULD HAVE NOTICED. OR THE OPPOSITE, BEING SO FIGETY OR RESTLESS THAT YOU HAVE BEEN MOVING AROUND A LOT MORE THAN USUAL: NOT AT ALL
10. IF YOU CHECKED OFF ANY PROBLEMS, HOW DIFFICULT HAVE THESE PROBLEMS MADE IT FOR YOU TO DO YOUR WORK, TAKE CARE OF THINGS AT HOME, OR GET ALONG WITH OTHER PEOPLE: NOT DIFFICULT AT ALL
6. FEELING BAD ABOUT YOURSELF - OR THAT YOU ARE A FAILURE OR HAVE LET YOURSELF OR YOUR FAMILY DOWN: NOT AT ALL
7. TROUBLE CONCENTRATING ON THINGS, SUCH AS READING THE NEWSPAPER OR WATCHING TELEVISION: NOT AT ALL
SUM OF ALL RESPONSES TO PHQ QUESTIONS 1-9: 2
2. FEELING DOWN, DEPRESSED OR HOPELESS: SEVERAL DAYS

## 2025-04-23 NOTE — PROGRESS NOTES
Attending Physician Note    I reviewed the patient's medical history, the resident's findings on physical examination, the patient's diagnoses, and treatment plan as documented in the resident note.  I concur with the treatment plan as documented.  Any additional suggestions noted below.    GE modifier    Brief summary:   Recurrent major depressive disorder, now in remission - continue fluoxetine and therapy. Plan lifetime treatment with prescription anti-depressant medication.

## 2025-04-23 NOTE — PROGRESS NOTES
Patient:Marty Elizondo  YOB: 2000  MRN: [id number]    Subjective   24 y.o. male who presents for the following: Follow-up  Last seen on 4/2/25     Things are getting better stable on the 40 mg   Has an interview tomorrow  Had a psychology appointment on 4/16, and will follow up PRN.  Take melatonin 20 mg for sleep at most 4 times a week (once this week)    HPI  Review of Systems   Review of Systems   All other systems reviewed and are negative.    Patient History    Past Medical History:  He has a past medical history of Allergies, Asthma, and Autism.    Social History:  He reports that he has never smoked. He has never used smokeless tobacco. He reports current alcohol use. He reports that he does not use drugs.     Past Surgical History:   Procedure Laterality Date    TONSILLECTOMY AND ADENOIDECTOMY        Family History   Problem Relation Age of Onset    Other Mother     Other Father      Objective     Vitals:    04/23/25 1409   BP: 116/70   BP Site: Left Upper Arm   Patient Position: Sitting   BP Cuff Size: Medium Adult   Pulse: 99   Resp: 18   Temp: 97.3 °F (36.3 °C)   TempSrc: Temporal   SpO2: 97%   Weight: 87.5 kg (193 lb)   Height: 1.727 m (5' 8\")   Body mass index is 29.35 kg/m².    Physical Exam  Labs & Imaging   Most Recent Labs:  No results found for: \"WBC\", \"HGB\", \"HCT\", \"PLT\", \"CHOL\", \"TRIG\", \"HDL\", \"LDLDIRECT\", \"ALT\", \"AST\", \"NA\", \"CL\", \"K\", \"CREATININE\", \"BUN\", \"CO2\", \"TSH\", \"INR\", \"GLUF\", \"LABA1C\", \"PSA\"  Most Recent Imaging:  XR TOE LEFT (MIN 2 VIEWS)  Narrative: PROCEDURE: XR TOE LEFT (MIN 2 VIEWS)    CLINICAL INFORMATION: Injury of toe on left foot, initial encounter. A refrigerator fell on the right toe.    COMPARISON: No prior study.    TECHNIQUE: An AP view of the left foot was obtained in addition to coned down oblique and lateral views of the great toe.    FINDINGS: There appears be mild soft tissue swelling of the great toe dorsally, as best seen on the lateral view. No

## 2025-06-18 DIAGNOSIS — F32.A ANXIETY AND DEPRESSION: ICD-10-CM

## 2025-06-18 DIAGNOSIS — F41.9 ANXIETY AND DEPRESSION: ICD-10-CM

## 2025-06-19 RX ORDER — FLUOXETINE HYDROCHLORIDE 40 MG/1
CAPSULE ORAL
Qty: 90 CAPSULE | Refills: 0 | Status: SHIPPED | OUTPATIENT
Start: 2025-06-19

## 2025-06-19 NOTE — TELEPHONE ENCOUNTER
Patient's last appointment was: 4/23/2025  with our   Patient's next appointment is: Visit date not found  with our    Which pharmacy does the script need sent to:   Count includes the [facility] - [place], [place]          No results found for: \"LABA1C\"  No results found for: \"CHOL\", \"TRIG\", \"HDL\", \"LDLDIRECT\"  No results found for: \"NA\", \"K\", \"CL\", \"CO2\", \"BUN\", \"CREATININE\", \"GLUCOSE\", \"CALCIUM\", \"LABALBU\", \"BILITOT\", \"ALKPHOS\", \"AST\", \"ALT\", \"LABGLOM\", \"GFRAA\", \"AGRATIO\", \"GLOB\"  No results found for: \"TSH\", \"X3ETFAE\", \"THYROIDAB\", \"FT3\", \"T4FREE\"  No results found for: \"WBC\", \"HGB\", \"HCT\", \"MCV\", \"PLT\"